# Patient Record
Sex: FEMALE | Race: OTHER | HISPANIC OR LATINO | URBAN - METROPOLITAN AREA
[De-identification: names, ages, dates, MRNs, and addresses within clinical notes are randomized per-mention and may not be internally consistent; named-entity substitution may affect disease eponyms.]

---

## 2018-01-17 ENCOUNTER — EMERGENCY (EMERGENCY)
Facility: HOSPITAL | Age: 58
LOS: 1 days | Discharge: HOME | End: 2018-01-17
Attending: EMERGENCY MEDICINE | Admitting: EMERGENCY MEDICINE
Payer: COMMERCIAL

## 2018-01-17 VITALS
HEART RATE: 87 BPM | SYSTOLIC BLOOD PRESSURE: 178 MMHG | TEMPERATURE: 98 F | OXYGEN SATURATION: 100 % | DIASTOLIC BLOOD PRESSURE: 98 MMHG

## 2018-01-17 VITALS — WEIGHT: 145.06 LBS | HEIGHT: 60 IN

## 2018-01-17 DIAGNOSIS — Z90.49 ACQUIRED ABSENCE OF OTHER SPECIFIED PARTS OF DIGESTIVE TRACT: Chronic | ICD-10-CM

## 2018-01-17 LAB
ALBUMIN SERPL ELPH-MCNC: 4.5 G/DL — SIGNIFICANT CHANGE UP (ref 3.3–5.2)
ALP SERPL-CCNC: 78 U/L — SIGNIFICANT CHANGE UP (ref 40–120)
ALT FLD-CCNC: 61 U/L — HIGH
ANION GAP SERPL CALC-SCNC: 16 MMOL/L — SIGNIFICANT CHANGE UP (ref 5–17)
APPEARANCE UR: CLEAR — SIGNIFICANT CHANGE UP
APTT BLD: 30.3 SEC — SIGNIFICANT CHANGE UP (ref 27.5–37.4)
AST SERPL-CCNC: 31 U/L — SIGNIFICANT CHANGE UP
BASOPHILS # BLD AUTO: 0 K/UL — SIGNIFICANT CHANGE UP (ref 0–0.2)
BASOPHILS NFR BLD AUTO: 0.3 % — SIGNIFICANT CHANGE UP (ref 0–2)
BILIRUB SERPL-MCNC: 0.5 MG/DL — SIGNIFICANT CHANGE UP (ref 0.4–2)
BILIRUB UR-MCNC: NEGATIVE — SIGNIFICANT CHANGE UP
BUN SERPL-MCNC: 16 MG/DL — SIGNIFICANT CHANGE UP (ref 8–20)
CALCIUM SERPL-MCNC: 10.7 MG/DL — HIGH (ref 8.6–10.2)
CHLORIDE SERPL-SCNC: 99 MMOL/L — SIGNIFICANT CHANGE UP (ref 98–107)
CO2 SERPL-SCNC: 25 MMOL/L — SIGNIFICANT CHANGE UP (ref 22–29)
COLOR SPEC: YELLOW — SIGNIFICANT CHANGE UP
CREAT SERPL-MCNC: 0.72 MG/DL — SIGNIFICANT CHANGE UP (ref 0.5–1.3)
DIFF PNL FLD: NEGATIVE — SIGNIFICANT CHANGE UP
EOSINOPHIL # BLD AUTO: 0.2 K/UL — SIGNIFICANT CHANGE UP (ref 0–0.5)
EOSINOPHIL NFR BLD AUTO: 1.5 % — SIGNIFICANT CHANGE UP (ref 0–6)
GLUCOSE SERPL-MCNC: 171 MG/DL — HIGH (ref 70–115)
GLUCOSE UR QL: NEGATIVE MG/DL — SIGNIFICANT CHANGE UP
HCT VFR BLD CALC: 44 % — SIGNIFICANT CHANGE UP (ref 37–47)
HGB BLD-MCNC: 15 G/DL — SIGNIFICANT CHANGE UP (ref 12–16)
INR BLD: 1.13 RATIO — SIGNIFICANT CHANGE UP (ref 0.88–1.16)
KETONES UR-MCNC: NEGATIVE — SIGNIFICANT CHANGE UP
LEUKOCYTE ESTERASE UR-ACNC: NEGATIVE — SIGNIFICANT CHANGE UP
LIDOCAIN IGE QN: 96 U/L — HIGH (ref 22–51)
LYMPHOCYTES # BLD AUTO: 4.4 K/UL — SIGNIFICANT CHANGE UP (ref 1–4.8)
LYMPHOCYTES # BLD AUTO: 41.2 % — SIGNIFICANT CHANGE UP (ref 20–55)
MAGNESIUM SERPL-MCNC: 2 MG/DL — SIGNIFICANT CHANGE UP (ref 1.6–2.6)
MCHC RBC-ENTMCNC: 29.8 PG — SIGNIFICANT CHANGE UP (ref 27–31)
MCHC RBC-ENTMCNC: 34.1 G/DL — SIGNIFICANT CHANGE UP (ref 32–36)
MCV RBC AUTO: 87.3 FL — SIGNIFICANT CHANGE UP (ref 81–99)
MONOCYTES # BLD AUTO: 0.5 K/UL — SIGNIFICANT CHANGE UP (ref 0–0.8)
MONOCYTES NFR BLD AUTO: 5 % — SIGNIFICANT CHANGE UP (ref 3–10)
NEUTROPHILS # BLD AUTO: 5.5 K/UL — SIGNIFICANT CHANGE UP (ref 1.8–8)
NEUTROPHILS NFR BLD AUTO: 51.7 % — SIGNIFICANT CHANGE UP (ref 37–73)
NITRITE UR-MCNC: NEGATIVE — SIGNIFICANT CHANGE UP
PH UR: 6.5 — SIGNIFICANT CHANGE UP (ref 5–8)
PHOSPHATE SERPL-MCNC: 4.1 MG/DL — SIGNIFICANT CHANGE UP (ref 2.4–4.7)
PLATELET # BLD AUTO: 332 K/UL — SIGNIFICANT CHANGE UP (ref 150–400)
POTASSIUM SERPL-MCNC: 4.2 MMOL/L — SIGNIFICANT CHANGE UP (ref 3.5–5.3)
POTASSIUM SERPL-SCNC: 4.2 MMOL/L — SIGNIFICANT CHANGE UP (ref 3.5–5.3)
PROT SERPL-MCNC: 8.7 G/DL — SIGNIFICANT CHANGE UP (ref 6.6–8.7)
PROT UR-MCNC: NEGATIVE MG/DL — SIGNIFICANT CHANGE UP
PROTHROM AB SERPL-ACNC: 12.5 SEC — SIGNIFICANT CHANGE UP (ref 9.8–12.7)
RBC # BLD: 5.04 M/UL — SIGNIFICANT CHANGE UP (ref 4.4–5.2)
RBC # FLD: 13.4 % — SIGNIFICANT CHANGE UP (ref 11–15.6)
SODIUM SERPL-SCNC: 140 MMOL/L — SIGNIFICANT CHANGE UP (ref 135–145)
SP GR SPEC: 1.01 — SIGNIFICANT CHANGE UP (ref 1.01–1.02)
UROBILINOGEN FLD QL: NEGATIVE MG/DL — SIGNIFICANT CHANGE UP
WBC # BLD: 10.7 K/UL — SIGNIFICANT CHANGE UP (ref 4.8–10.8)
WBC # FLD AUTO: 10.7 K/UL — SIGNIFICANT CHANGE UP (ref 4.8–10.8)

## 2018-01-17 PROCEDURE — 85730 THROMBOPLASTIN TIME PARTIAL: CPT

## 2018-01-17 PROCEDURE — 83690 ASSAY OF LIPASE: CPT

## 2018-01-17 PROCEDURE — T1013: CPT

## 2018-01-17 PROCEDURE — 81003 URINALYSIS AUTO W/O SCOPE: CPT

## 2018-01-17 PROCEDURE — 85027 COMPLETE CBC AUTOMATED: CPT

## 2018-01-17 PROCEDURE — 83735 ASSAY OF MAGNESIUM: CPT

## 2018-01-17 PROCEDURE — 99283 EMERGENCY DEPT VISIT LOW MDM: CPT | Mod: 25

## 2018-01-17 PROCEDURE — 36415 COLL VENOUS BLD VENIPUNCTURE: CPT

## 2018-01-17 PROCEDURE — 84100 ASSAY OF PHOSPHORUS: CPT

## 2018-01-17 PROCEDURE — 80053 COMPREHEN METABOLIC PANEL: CPT

## 2018-01-17 PROCEDURE — 93010 ELECTROCARDIOGRAM REPORT: CPT

## 2018-01-17 PROCEDURE — 87086 URINE CULTURE/COLONY COUNT: CPT

## 2018-01-17 PROCEDURE — 93005 ELECTROCARDIOGRAM TRACING: CPT

## 2018-01-17 PROCEDURE — 85610 PROTHROMBIN TIME: CPT

## 2018-01-17 PROCEDURE — 99284 EMERGENCY DEPT VISIT MOD MDM: CPT

## 2018-01-17 RX ORDER — LIDOCAINE 4 G/100G
10 CREAM TOPICAL ONCE
Qty: 0 | Refills: 0 | Status: COMPLETED | OUTPATIENT
Start: 2018-01-17 | End: 2018-01-17

## 2018-01-17 RX ORDER — ACETAMINOPHEN 500 MG
650 TABLET ORAL ONCE
Qty: 0 | Refills: 0 | Status: COMPLETED | OUTPATIENT
Start: 2018-01-17 | End: 2018-01-17

## 2018-01-17 RX ADMIN — Medication 30 MILLILITER(S): at 22:57

## 2018-01-17 RX ADMIN — Medication 650 MILLIGRAM(S): at 21:53

## 2018-01-17 RX ADMIN — LIDOCAINE 10 MILLILITER(S): 4 CREAM TOPICAL at 22:57

## 2018-01-17 NOTE — ED PROVIDER NOTE - MEDICAL DECISION MAKING DETAILS
feeling better tolerating po fluids retun to ed for itnractable abd pain fever or any overall worsneing

## 2018-01-17 NOTE — ED PROVIDER NOTE - OBJECTIVE STATEMENT
56 y/o f pt with a pmhx of appendectomy, DM, HTN  presents to the ED c/o abdominal pain, nausea and vomiting x1 month exacerbating this afternoon. Describes sensation as pressure and gassy. Has been taken Omeprazole for the issue with no relief. Today her pain was exacerbated due to meal consumption. Denies HA, dizziness, numbness, tingling, photophobia, diplopia, change in vision/hearing/gait/mental status/speech, focal weakness, neck pain, rash, fever, chills, stiffness, hx of DVT/PE, leg swelling, CP, SOB, palpitations, diaphoresis, D/C, change in urinary/bowel function, dysuria, hematuria, flank pain, malaise, or motor/sensory deficits. No further complaints at this time. NKDA.

## 2018-01-17 NOTE — ED ADULT NURSE NOTE - OBJECTIVE STATEMENT
Assumed pt care at 2050.  Pt a&ox3 with VSS and family at bedside.  Pt c/o abdominal pain from mid umbilical radiating to L side of abdomen for the past week with one episode of vomiting today.  Pt also reporting that this pain has been increasing over the past month intermittently.  Abdomen soft yet tender on palpation, no acute s/s of respiratory distress noted or reported, MD Andrea made aware of pts pain, awaiting further orders, will continue to monitor

## 2018-01-17 NOTE — ED ADULT TRIAGE NOTE - CHIEF COMPLAINT QUOTE
pt c/o abd pain for 1 month. pt states that the pain is worse and the pain is epigastric with nausea

## 2018-01-19 LAB
CULTURE RESULTS: SIGNIFICANT CHANGE UP
SPECIMEN SOURCE: SIGNIFICANT CHANGE UP

## 2018-11-20 PROBLEM — Z00.00 ENCOUNTER FOR PREVENTIVE HEALTH EXAMINATION: Status: ACTIVE | Noted: 2018-11-20

## 2018-12-20 ENCOUNTER — APPOINTMENT (OUTPATIENT)
Dept: ENDOCRINOLOGY | Facility: CLINIC | Age: 58
End: 2018-12-20

## 2021-09-14 ENCOUNTER — EMERGENCY (EMERGENCY)
Facility: HOSPITAL | Age: 61
LOS: 1 days | Discharge: HOME | End: 2021-09-14
Attending: EMERGENCY MEDICINE
Payer: SELF-PAY

## 2021-09-14 VITALS
WEIGHT: 151.9 LBS | SYSTOLIC BLOOD PRESSURE: 138 MMHG | OXYGEN SATURATION: 99 % | DIASTOLIC BLOOD PRESSURE: 83 MMHG | TEMPERATURE: 98 F | HEIGHT: 60 IN | RESPIRATION RATE: 19 BRPM | HEART RATE: 99 BPM

## 2021-09-14 DIAGNOSIS — Z90.49 ACQUIRED ABSENCE OF OTHER SPECIFIED PARTS OF DIGESTIVE TRACT: Chronic | ICD-10-CM

## 2021-09-14 PROCEDURE — 99284 EMERGENCY DEPT VISIT MOD MDM: CPT | Mod: 25

## 2021-09-14 PROCEDURE — 73610 X-RAY EXAM OF ANKLE: CPT

## 2021-09-14 PROCEDURE — 29515 APPLICATION SHORT LEG SPLINT: CPT | Mod: RT

## 2021-09-14 PROCEDURE — 73630 X-RAY EXAM OF FOOT: CPT

## 2021-09-14 PROCEDURE — T1013: CPT

## 2021-09-14 PROCEDURE — 73610 X-RAY EXAM OF ANKLE: CPT | Mod: 26,RT

## 2021-09-14 PROCEDURE — 73630 X-RAY EXAM OF FOOT: CPT | Mod: 26,RT

## 2021-09-14 NOTE — ED PROVIDER NOTE - CARE PLAN
Principal Discharge DX:	Sprain of other ligament of right ankle   1 Principal Discharge DX:	Fracture of distal end of right fibula

## 2021-09-14 NOTE — ED PROVIDER NOTE - CARE PROVIDER_API CALL
Hilton Nolan)  Orthopaedic Surgery  [address] [place] [address]  Phone: [phone]  Fax: [phone]  Follow Up Time:

## 2021-09-14 NOTE — ED PROVIDER NOTE - PATIENT PORTAL LINK FT
You can access the FollowMyHealth Patient Portal offered by [facility] by registering at the following website: [email or web address]. By joining [facility]’s FollowMyHealth portal, you will also be able to view your health information using other applications (apps) compatible with our system.

## 2021-09-14 NOTE — ED ADULT TRIAGE NOTE - CHIEF COMPLAINT QUOTE
Pt. c/o right ankle and foot pain s/p injury on sunday from fall while getting out of car.  Negative head trauma or LOC.  Pt states "I twisted it when I was getting out of the car and it cause me to fall."  Pt. unable to bear full weight to RLE

## 2021-09-14 NOTE — ED PROVIDER NOTE - CLINICAL SUMMARY MEDICAL DECISION MAKING FREE TEXT BOX
Diagnostic imaging results reviewed with patient who declined splint; analgesics, rest; PMD or ortho follow up recommended for reassessment. Patient is aware of signs/symptoms to return to the emergency department. Diagnostic imaging results reviewed with patient; splint; analgesics, rest; PMD or ortho follow up recommended for reassessment. Patient is aware of signs/symptoms to return to the emergency department.

## 2021-09-14 NOTE — ED PROVIDER NOTE - PROGRESS NOTE DETAILS
history and physical performed by intake physician - results reviewed and case discussed with attending  splinted - given crutches and ortho f/u

## 2021-09-22 ENCOUNTER — APPOINTMENT (OUTPATIENT)
Dept: ORTHOPEDIC SURGERY | Facility: CLINIC | Age: 61
End: 2021-09-22

## 2022-03-24 NOTE — ED ADULT TRIAGE NOTE - NS ED NURSE BANDS TYPE
Chief Complaint  Follow-up    Subjective          History of Present Illness  The patient is here to follow up on bilateral mastectomy with SLN bx.  They are doing well and have no complaints.  Pathology is shown below:    Clinical Information    Comment:    Malignant neoplasm of overlapping sites of right breast in female, estrogen receptor positive (HCC)      Final Diagnosis   1. Left breast, mastectomy:               - Sclerosing adenosis               - Intraductal papilloma               - Radial scar               - Florid usual ductal hyperplasia               - Fibroadenomatoid change               - Duct ectasia with calcification               - Fibrosis               - Calcified blood vessels     2. Right breast, mastectomy:               - Invasive carcinoma of no special type (ductal)               - High grade ductal carcinoma in situ (DCIS)               - See synoptic checklist     3. Right sentinel lymph node #1, excision:               - One lymph node negative for carcinoma (0/1)               - See synoptic checklist     4. Right breast, new margin, excision:               - Negative for carcinoma               - See synoptic checklist     5. Right axillary contents, dissection:               - Seventeen lymph nodes negative for carcinoma (0/17)               - See synoptic checklist      Electronically signed by Monty Joyner MD on 1/13/2022 at 1411   Synoptic Checklist     INVASIVE CARCINOMA OF THE BREAST: Resection  8th Edition - Protocol posted: 6/30/2021  INVASIVE CARCINOMA OF THE BREAST: COMPLETE EXCISION - 2, 3, 4, 5  SPECIMEN   Procedure  Total mastectomy    Specimen Laterality  Right    TUMOR   Histologic Type  Invasive carcinoma of no special type (ductal)    Histologic Grade (Ramon Histologic Score)     Glandular (Acinar) / Tubular Differentiation  Score 3    Nuclear Pleomorphism  Score 2    Mitotic Rate  Score 1    Overall Grade  Grade 2 (scores of 6 or 7)    Tumor Size   "Greatest dimension of largest invasive focus (Millimeters): 18 mm   Tumor Focality  Multiple foci of invasive carcinoma    Number of Foci  At least: 2    Ductal Carcinoma In Situ (DCIS)  Present    Size (Extent) of DCIS  Cannot be determined    Number of Blocks with DCIS  5    Number of Blocks Examined  13    Architectural Patterns  [place]      Cribriform    Nuclear Grade  Grade III (high)    Necrosis  Present, central (expansive \"comedo\" necrosis)    Tumor Extent     Treatment Effect in the Breast  No known presurgical therapy    MARGINS   Margin Status for Invasive Carcinoma  All margins negative for invasive carcinoma    Distance from Invasive Carcinoma to Closest Margin  5 mm   Closest Margin(s) to Invasive Carcinoma  Posterior    Margin Status for DCIS  All margins negative for DCIS    Distance from DCIS to Closest Margin  7 mm   Closest Margin(s) to DCIS  Posterior    REGIONAL LYMPH NODES   Regional Lymph Node Status  All regional lymph nodes negative for tumor    Total Number of Lymph Nodes Examined (sentinel and non-sentinel)  18    Number of [place] Nodes Examined  1    PATHOLOGIC STAGE CLASSIFICATION (pTNM, AJCC 8th Edition)   Reporting of pT, pN, and (when applicable) pM categories is based on information available to the pathologist at the time the report is issued. As per the AJCC (Chapter 1, 8th Ed.) it is the managing physician’s responsibility to establish the final pathologic stage based upon all pertinent information, including but potentially not limited to this pathology report.   TNM Descriptors  m (multiple foci of invasive carcinoma)    pT Category  pT1c    pN Category  pN0    Breast Biomarker Testing Performed on Previous Biopsy     Estrogen Receptor (ER) Status  Positive (greater than 10% of cells demonstrate nuclear positivity)    Percentage of Cells with Nuclear Positivity  100 %   Breast Biomarker Testing Performed on Previous Biopsy     Progesterone Receptor (PgR) Status  Positive  " "  Percentage of Cells with Nuclear Positivity  100 %   Breast Biomarker Testing Performed on Previous Biopsy     HER2 (by immunohistochemistry)  Equivocal (Score 2+)    Breast Biomarker Testing Performed on Previous Biopsy     HER2 (by in situ hybridization)  Negative (not amplified)    Breast Biomarker Testing Performed on Previous Biopsy     Ki-67 Percentage of Positive Nuclei  5 %   Testing Performed on Case Number  [id number]           Has hematoma from eliquis and was seen by Mavis HAGAN las week.  She has had this drained twice with the recurring seroma times two-point time I feel she would best be served by drain with continuous suction at this place we will see if radiology can place this.    Objective   Vital Signs:   Resp 15   Ht 165.1 cm (65\")   Wt 90.7 kg (200 lb)   BMI 33.28 kg/m²     Physical Exam  Vitals and nursing note reviewed.   Constitutional:       General: She is not in acute distress.     Appearance: Normal appearance. She is well-developed.   HENT:      Head: Normocephalic and atraumatic.   Eyes:      Extraocular Movements: Extraocular movements intact.      Pupils: Pupils are equal, round, and reactive to light.   Cardiovascular:      Pulses: Normal pulses.   Pulmonary:      Effort: Pulmonary effort is normal. No retractions.      Breath sounds: Normal air entry. No wheezing.   Abdominal:      General: There is no distension.      Palpations: Abdomen is soft.      Tenderness: There is no abdominal tenderness.      Hernia: No hernia is present.   Musculoskeletal:         General: No swelling or deformity.      Cervical back: Neck supple.   Skin:     General: Skin is warm and dry.      Findings: No erythema.      Comments: Surgical Incision Healing Well   Neurological:      General: No focal deficit present.      Mental Status: She is alert and oriented to person, place, and time.      Motor: Motor function is intact.   Psychiatric:         Mood and Affect: Mood normal.         Thought " Content: Thought content normal.                 Assessment and Plan    Diagnoses and all orders for this visit:    1. Malignant neoplasm of overlapping sites of right breast in female, estrogen receptor positive (HCC) (Primary)    2. Seroma of breast  -     US Guided Abscess Drain Breast Right; Future    Keep appointment with oncology  Follow-up with me 2 weeks  Have radiology place drain    Follow Up   Return in about 2 weeks (around 4/7/2022).  Patient was given instructions and counseling regarding her condition or for health maintenance advice. Please see specific information pulled into the AVS if appropriate.      Name band;

## 2024-05-10 ENCOUNTER — OFFICE (OUTPATIENT)
Dept: URBAN - METROPOLITAN AREA CLINIC 94 | Facility: CLINIC | Age: 64
Setting detail: OPHTHALMOLOGY
End: 2024-05-10
Payer: COMMERCIAL

## 2024-05-10 DIAGNOSIS — H35.3131: ICD-10-CM

## 2024-05-10 DIAGNOSIS — H16.221: ICD-10-CM

## 2024-05-10 DIAGNOSIS — H16.222: ICD-10-CM

## 2024-05-10 DIAGNOSIS — H35.033: ICD-10-CM

## 2024-05-10 PROCEDURE — 99214 OFFICE O/P EST MOD 30 MIN: CPT | Performed by: OPHTHALMOLOGY

## 2024-05-10 PROCEDURE — 92250 FUNDUS PHOTOGRAPHY W/I&R: CPT | Performed by: OPHTHALMOLOGY

## 2024-05-10 PROCEDURE — 83861 MICROFLUID ANALY TEARS: CPT | Mod: RT | Performed by: OPHTHALMOLOGY

## 2024-05-10 PROCEDURE — 83861 MICROFLUID ANALY TEARS: CPT | Mod: LT | Performed by: OPHTHALMOLOGY

## 2024-05-10 ASSESSMENT — CONFRONTATIONAL VISUAL FIELD TEST (CVF)
OS_FINDINGS: FULL
OD_FINDINGS: FULL

## 2024-05-13 ENCOUNTER — ASC (OUTPATIENT)
Dept: URBAN - METROPOLITAN AREA SURGERY 8 | Facility: SURGERY | Age: 64
Setting detail: OPHTHALMOLOGY
End: 2024-05-13
Payer: COMMERCIAL

## 2024-05-13 ENCOUNTER — OFFICE (OUTPATIENT)
Dept: URBAN - METROPOLITAN AREA CLINIC 94 | Facility: CLINIC | Age: 64
Setting detail: OPHTHALMOLOGY
End: 2024-05-13
Payer: COMMERCIAL

## 2024-05-13 DIAGNOSIS — H35.033: ICD-10-CM

## 2024-05-13 DIAGNOSIS — E11.3312: ICD-10-CM

## 2024-05-13 DIAGNOSIS — H43.23: ICD-10-CM

## 2024-05-13 DIAGNOSIS — E11.3313: ICD-10-CM

## 2024-05-13 DIAGNOSIS — H35.3131: ICD-10-CM

## 2024-05-13 PROBLEM — H16.222 DRY EYE SYNDROME K SICCA; RIGHT EYE, LEFT EYE, BOTH EYES: Status: ACTIVE | Noted: 2024-05-10

## 2024-05-13 PROBLEM — H26.493 POSTERIOR CAPSULAR OPACIFICATION; BOTH EYES: Status: ACTIVE | Noted: 2024-05-10

## 2024-05-13 PROBLEM — H16.221 DRY EYE SYNDROME K SICCA; RIGHT EYE, LEFT EYE, BOTH EYES: Status: ACTIVE | Noted: 2024-05-10

## 2024-05-13 PROBLEM — H16.223 DRY EYE SYNDROME K SICCA; RIGHT EYE, LEFT EYE, BOTH EYES: Status: ACTIVE | Noted: 2024-05-10

## 2024-05-13 PROCEDURE — 92235 FLUORESCEIN ANGRPH MLTIFRAME: CPT | Performed by: SPECIALIST

## 2024-05-13 PROCEDURE — 92014 COMPRE OPH EXAM EST PT 1/>: CPT | Mod: 57 | Performed by: SPECIALIST

## 2024-05-13 PROCEDURE — 67210 TREATMENT OF RETINAL LESION: CPT | Mod: LT | Performed by: SPECIALIST

## 2024-05-13 PROCEDURE — 92134 CPTRZ OPH DX IMG PST SGM RTA: CPT | Performed by: SPECIALIST

## 2024-05-13 ASSESSMENT — CONFRONTATIONAL VISUAL FIELD TEST (CVF)
OS_FINDINGS: FULL
OD_FINDINGS: FULL

## 2024-06-06 ENCOUNTER — ASC (OUTPATIENT)
Dept: URBAN - METROPOLITAN AREA SURGERY 8 | Facility: SURGERY | Age: 64
Setting detail: OPHTHALMOLOGY
End: 2024-06-06
Payer: COMMERCIAL

## 2024-06-06 DIAGNOSIS — H26.491: ICD-10-CM

## 2024-06-06 PROCEDURE — 66821 AFTER CATARACT LASER SURGERY: CPT | Mod: 79,RT | Performed by: OPHTHALMOLOGY

## 2024-06-07 ENCOUNTER — RX ONLY (RX ONLY)
Age: 64
End: 2024-06-07

## 2024-06-07 ENCOUNTER — OFFICE (OUTPATIENT)
Dept: URBAN - METROPOLITAN AREA CLINIC 94 | Facility: CLINIC | Age: 64
Setting detail: OPHTHALMOLOGY
End: 2024-06-07
Payer: COMMERCIAL

## 2024-06-07 ENCOUNTER — ASC (OUTPATIENT)
Dept: URBAN - METROPOLITAN AREA SURGERY 8 | Facility: SURGERY | Age: 64
Setting detail: OPHTHALMOLOGY
End: 2024-06-07
Payer: COMMERCIAL

## 2024-06-07 DIAGNOSIS — E11.3311: ICD-10-CM

## 2024-06-07 DIAGNOSIS — E11.3313: ICD-10-CM

## 2024-06-07 DIAGNOSIS — H26.492: ICD-10-CM

## 2024-06-07 DIAGNOSIS — E11.3312: ICD-10-CM

## 2024-06-07 PROCEDURE — 92134 CPTRZ OPH DX IMG PST SGM RTA: CPT | Performed by: OPHTHALMOLOGY

## 2024-06-07 PROCEDURE — 99024 POSTOP FOLLOW-UP VISIT: CPT | Mod: 24,57 | Performed by: OPHTHALMOLOGY

## 2024-06-07 PROCEDURE — 66821 AFTER CATARACT LASER SURGERY: CPT | Mod: 79,LT | Performed by: OPHTHALMOLOGY

## 2024-06-17 ENCOUNTER — OFFICE (OUTPATIENT)
Dept: URBAN - METROPOLITAN AREA CLINIC 94 | Facility: CLINIC | Age: 64
Setting detail: OPHTHALMOLOGY
End: 2024-06-17
Payer: COMMERCIAL

## 2024-06-17 DIAGNOSIS — E11.3311: ICD-10-CM

## 2024-06-17 DIAGNOSIS — H35.3131: ICD-10-CM

## 2024-06-17 DIAGNOSIS — H35.033: ICD-10-CM

## 2024-06-17 PROCEDURE — 92134 CPTRZ OPH DX IMG PST SGM RTA: CPT | Performed by: SPECIALIST

## 2024-06-17 PROCEDURE — 67028 INJECTION EYE DRUG: CPT | Mod: 79,RT | Performed by: SPECIALIST

## 2024-06-17 ASSESSMENT — CONFRONTATIONAL VISUAL FIELD TEST (CVF)
OS_FINDINGS: FULL
OD_FINDINGS: FULL

## 2024-06-28 ENCOUNTER — OFFICE (OUTPATIENT)
Dept: URBAN - METROPOLITAN AREA CLINIC 94 | Facility: CLINIC | Age: 64
Setting detail: OPHTHALMOLOGY
End: 2024-06-28
Payer: COMMERCIAL

## 2024-06-28 DIAGNOSIS — H43.311: ICD-10-CM

## 2024-06-28 PROBLEM — H26.491 POSTERIOR CAPSULAR OPACIFICATION; RIGHT EYE, LEFT EYE: Status: RESOLVED | Noted: 2024-06-06 | Resolved: 2024-06-28

## 2024-06-28 PROBLEM — H26.492 POSTERIOR CAPSULAR OPACIFICATION; RIGHT EYE, LEFT EYE: Status: RESOLVED | Noted: 2024-06-06 | Resolved: 2024-06-28

## 2024-06-28 PROCEDURE — 99212 OFFICE O/P EST SF 10 MIN: CPT | Mod: 24 | Performed by: OPHTHALMOLOGY

## 2024-06-28 ASSESSMENT — CONFRONTATIONAL VISUAL FIELD TEST (CVF)
OD_FINDINGS: FULL
OS_FINDINGS: FULL

## 2024-07-08 ENCOUNTER — ASC (OUTPATIENT)
Dept: URBAN - METROPOLITAN AREA SURGERY 8 | Facility: SURGERY | Age: 64
Setting detail: OPHTHALMOLOGY
End: 2024-07-08
Payer: COMMERCIAL

## 2024-07-08 DIAGNOSIS — H35.033: ICD-10-CM

## 2024-07-08 DIAGNOSIS — H35.3131: ICD-10-CM

## 2024-07-08 DIAGNOSIS — E11.3311: ICD-10-CM

## 2024-07-08 DIAGNOSIS — E11.3312: ICD-10-CM

## 2024-07-08 PROCEDURE — 67210 TREATMENT OF RETINAL LESION: CPT | Mod: 79,RT | Performed by: SPECIALIST

## 2024-07-08 PROCEDURE — 99024 POSTOP FOLLOW-UP VISIT: CPT | Performed by: SPECIALIST

## 2024-07-08 ASSESSMENT — CONFRONTATIONAL VISUAL FIELD TEST (CVF)
OD_FINDINGS: FULL
OS_FINDINGS: FULL

## 2024-07-10 ENCOUNTER — ASC (OUTPATIENT)
Dept: URBAN - METROPOLITAN AREA SURGERY 8 | Facility: SURGERY | Age: 64
Setting detail: OPHTHALMOLOGY
End: 2024-07-10
Payer: COMMERCIAL

## 2024-07-10 DIAGNOSIS — H43.311: ICD-10-CM

## 2024-07-10 PROCEDURE — 67031 LASER SURGERY EYE STRANDS: CPT | Mod: 79,RT | Performed by: OPHTHALMOLOGY

## 2024-07-22 ENCOUNTER — OFFICE (OUTPATIENT)
Dept: URBAN - METROPOLITAN AREA CLINIC 116 | Facility: CLINIC | Age: 64
Setting detail: OPHTHALMOLOGY
End: 2024-07-22
Payer: COMMERCIAL

## 2024-07-22 DIAGNOSIS — H43.311: ICD-10-CM

## 2024-07-22 PROCEDURE — 99024 POSTOP FOLLOW-UP VISIT: CPT | Performed by: OPTOMETRIST

## 2024-07-22 PROCEDURE — 92250 FUNDUS PHOTOGRAPHY W/I&R: CPT | Performed by: OPTOMETRIST

## 2024-07-22 ASSESSMENT — CONFRONTATIONAL VISUAL FIELD TEST (CVF)
OS_FINDINGS: FULL
OD_FINDINGS: FULL

## 2024-09-09 ENCOUNTER — OFFICE (OUTPATIENT)
Dept: URBAN - METROPOLITAN AREA CLINIC 94 | Facility: CLINIC | Age: 64
Setting detail: OPHTHALMOLOGY
End: 2024-09-09
Payer: COMMERCIAL

## 2024-09-09 ENCOUNTER — ASC (OUTPATIENT)
Dept: URBAN - METROPOLITAN AREA SURGERY 8 | Facility: SURGERY | Age: 64
Setting detail: OPHTHALMOLOGY
End: 2024-09-09
Payer: COMMERCIAL

## 2024-09-09 DIAGNOSIS — H35.3111: ICD-10-CM

## 2024-09-09 DIAGNOSIS — E11.3311: ICD-10-CM

## 2024-09-09 DIAGNOSIS — E11.3312: ICD-10-CM

## 2024-09-09 DIAGNOSIS — H35.3121: ICD-10-CM

## 2024-09-09 DIAGNOSIS — H35.033: ICD-10-CM

## 2024-09-09 DIAGNOSIS — H43.23: ICD-10-CM

## 2024-09-09 DIAGNOSIS — H35.3131: ICD-10-CM

## 2024-09-09 PROCEDURE — 92235 FLUORESCEIN ANGRPH MLTIFRAME: CPT | Performed by: SPECIALIST

## 2024-09-09 PROCEDURE — 67210 TREATMENT OF RETINAL LESION: CPT | Mod: 79,LT | Performed by: SPECIALIST

## 2024-09-09 PROCEDURE — 92134 CPTRZ OPH DX IMG PST SGM RTA: CPT | Performed by: SPECIALIST

## 2024-09-09 PROCEDURE — 99024 POSTOP FOLLOW-UP VISIT: CPT | Performed by: SPECIALIST

## 2024-09-09 ASSESSMENT — CONFRONTATIONAL VISUAL FIELD TEST (CVF)
OS_FINDINGS: FULL
OD_FINDINGS: FULL

## 2024-10-07 ENCOUNTER — ASC (OUTPATIENT)
Dept: URBAN - METROPOLITAN AREA SURGERY 8 | Facility: SURGERY | Age: 64
Setting detail: OPHTHALMOLOGY
End: 2024-10-07
Payer: COMMERCIAL

## 2024-10-07 DIAGNOSIS — H35.3131: ICD-10-CM

## 2024-10-07 DIAGNOSIS — H35.3111: ICD-10-CM

## 2024-10-07 DIAGNOSIS — H35.3121: ICD-10-CM

## 2024-10-07 DIAGNOSIS — E11.3312: ICD-10-CM

## 2024-10-07 DIAGNOSIS — H43.23: ICD-10-CM

## 2024-10-07 DIAGNOSIS — E11.3311: ICD-10-CM

## 2024-10-07 DIAGNOSIS — H35.033: ICD-10-CM

## 2024-10-07 PROCEDURE — 67210 TREATMENT OF RETINAL LESION: CPT | Mod: 79,RT | Performed by: SPECIALIST

## 2024-10-07 PROCEDURE — 99024 POSTOP FOLLOW-UP VISIT: CPT | Performed by: SPECIALIST

## 2024-10-07 ASSESSMENT — VISUAL ACUITY
OD_BCVA: 20/40
OS_BCVA: 20/30+1

## 2024-10-07 ASSESSMENT — KERATOMETRY
OS_K2POWER_DIOPTERS: 46.50
OS_K1POWER_DIOPTERS: 45.50
OD_K2POWER_DIOPTERS: 47.00
OD_AXISANGLE_DEGREES: 054
OS_AXISANGLE_DEGREES: 112
METHOD_AUTO_MANUAL: AUTO
OD_K1POWER_DIOPTERS: 45.25

## 2024-10-07 ASSESSMENT — CONFRONTATIONAL VISUAL FIELD TEST (CVF)
OD_FINDINGS: FULL
OS_FINDINGS: FULL

## 2024-10-07 ASSESSMENT — REFRACTION_AUTOREFRACTION
OS_AXIS: 051
OD_AXIS: 112
OS_SPHERE: +0.75
OS_CYLINDER: -0.75
OD_SPHERE: +0.50
OD_CYLINDER: -0.50

## 2024-10-07 ASSESSMENT — SUPERFICIAL PUNCTATE KERATITIS (SPK)
OD_SPK: T
OS_SPK: T

## 2024-10-07 ASSESSMENT — TONOMETRY: OS_IOP_MMHG: 17

## 2024-11-18 ENCOUNTER — OFFICE (OUTPATIENT)
Dept: URBAN - METROPOLITAN AREA CLINIC 94 | Facility: CLINIC | Age: 64
Setting detail: OPHTHALMOLOGY
End: 2024-11-18
Payer: COMMERCIAL

## 2024-11-18 DIAGNOSIS — H35.033: ICD-10-CM

## 2024-11-18 DIAGNOSIS — E11.3311: ICD-10-CM

## 2024-11-18 DIAGNOSIS — H35.3131: ICD-10-CM

## 2024-11-18 DIAGNOSIS — E11.3312: ICD-10-CM

## 2024-11-18 PROCEDURE — 92134 CPTRZ OPH DX IMG PST SGM RTA: CPT | Performed by: SPECIALIST

## 2024-11-18 PROCEDURE — 92235 FLUORESCEIN ANGRPH MLTIFRAME: CPT | Performed by: SPECIALIST

## 2024-11-18 PROCEDURE — 67028 INJECTION EYE DRUG: CPT | Mod: 58,RT | Performed by: SPECIALIST

## 2024-11-18 ASSESSMENT — REFRACTION_AUTOREFRACTION
OS_SPHERE: +0.75
OS_CYLINDER: -0.75
OD_SPHERE: +0.50
OS_AXIS: 051
OD_AXIS: 112
OD_CYLINDER: -0.50

## 2024-11-18 ASSESSMENT — KERATOMETRY
OD_AXISANGLE_DEGREES: 054
OS_K1POWER_DIOPTERS: 45.50
OS_K2POWER_DIOPTERS: 46.50
OD_K1POWER_DIOPTERS: 45.25
OD_K2POWER_DIOPTERS: 47.00
METHOD_AUTO_MANUAL: AUTO
OS_AXISANGLE_DEGREES: 112

## 2024-11-18 ASSESSMENT — TONOMETRY: OS_IOP_MMHG: 16

## 2024-11-18 ASSESSMENT — CONFRONTATIONAL VISUAL FIELD TEST (CVF)
OS_FINDINGS: FULL
OD_FINDINGS: FULL

## 2024-11-18 ASSESSMENT — VISUAL ACUITY
OD_BCVA: 20/40
OS_BCVA: 20/30+

## 2024-11-18 ASSESSMENT — SUPERFICIAL PUNCTATE KERATITIS (SPK)
OD_SPK: T
OS_SPK: T

## 2024-11-25 ENCOUNTER — OFFICE (OUTPATIENT)
Dept: URBAN - METROPOLITAN AREA CLINIC 94 | Facility: CLINIC | Age: 64
Setting detail: OPHTHALMOLOGY
End: 2024-11-25
Payer: COMMERCIAL

## 2024-11-25 DIAGNOSIS — E11.3312: ICD-10-CM

## 2024-11-25 DIAGNOSIS — H43.23: ICD-10-CM

## 2024-11-25 DIAGNOSIS — H35.033: ICD-10-CM

## 2024-11-25 DIAGNOSIS — H35.3131: ICD-10-CM

## 2024-11-25 DIAGNOSIS — E11.3311: ICD-10-CM

## 2024-11-25 PROCEDURE — 92134 CPTRZ OPH DX IMG PST SGM RTA: CPT | Performed by: SPECIALIST

## 2024-11-25 PROCEDURE — 99024 POSTOP FOLLOW-UP VISIT: CPT | Performed by: SPECIALIST

## 2024-11-25 ASSESSMENT — CONFRONTATIONAL VISUAL FIELD TEST (CVF)
OD_FINDINGS: FULL
OS_FINDINGS: FULL

## 2024-11-25 ASSESSMENT — KERATOMETRY
OS_K2POWER_DIOPTERS: 46.50
OD_K1POWER_DIOPTERS: 45.25
OS_AXISANGLE_DEGREES: 112
OD_K2POWER_DIOPTERS: 47.00
METHOD_AUTO_MANUAL: AUTO
OD_AXISANGLE_DEGREES: 054
OS_K1POWER_DIOPTERS: 45.50

## 2024-11-25 ASSESSMENT — REFRACTION_AUTOREFRACTION
OS_CYLINDER: -0.75
OD_SPHERE: +0.50
OD_AXIS: 112
OS_SPHERE: +0.75
OD_CYLINDER: -0.50
OS_AXIS: 051

## 2024-11-25 ASSESSMENT — VISUAL ACUITY
OD_BCVA: 20/30+
OS_BCVA: 20/40

## 2024-11-25 ASSESSMENT — SUPERFICIAL PUNCTATE KERATITIS (SPK)
OD_SPK: T
OS_SPK: T

## 2024-11-25 ASSESSMENT — TONOMETRY: OS_IOP_MMHG: 17

## 2025-01-06 ENCOUNTER — ASC (OUTPATIENT)
Dept: URBAN - METROPOLITAN AREA SURGERY 8 | Facility: SURGERY | Age: 65
Setting detail: OPHTHALMOLOGY
End: 2025-01-06
Payer: COMMERCIAL

## 2025-01-06 ENCOUNTER — OFFICE (OUTPATIENT)
Dept: URBAN - METROPOLITAN AREA CLINIC 94 | Facility: CLINIC | Age: 65
Setting detail: OPHTHALMOLOGY
End: 2025-01-06
Payer: COMMERCIAL

## 2025-01-06 DIAGNOSIS — E11.3312: ICD-10-CM

## 2025-01-06 DIAGNOSIS — H35.033: ICD-10-CM

## 2025-01-06 DIAGNOSIS — H35.3131: ICD-10-CM

## 2025-01-06 PROCEDURE — 67210 TREATMENT OF RETINAL LESION: CPT | Mod: LT | Performed by: SPECIALIST

## 2025-01-06 PROCEDURE — 92134 CPTRZ OPH DX IMG PST SGM RTA: CPT | Performed by: SPECIALIST

## 2025-01-06 PROCEDURE — 92012 INTRM OPH EXAM EST PATIENT: CPT | Mod: 57 | Performed by: SPECIALIST

## 2025-01-06 ASSESSMENT — TONOMETRY: OS_IOP_MMHG: 16

## 2025-01-06 ASSESSMENT — CONFRONTATIONAL VISUAL FIELD TEST (CVF)
OS_FINDINGS: FULL
OD_FINDINGS: FULL

## 2025-01-06 ASSESSMENT — VISUAL ACUITY
OS_BCVA: 20/25-1
OD_BCVA: 20/25

## 2025-01-06 ASSESSMENT — KERATOMETRY
METHOD_AUTO_MANUAL: AUTO
OD_AXISANGLE_DEGREES: 054
OD_K1POWER_DIOPTERS: 45.25
OD_K2POWER_DIOPTERS: 47.00
OS_AXISANGLE_DEGREES: 112
OS_K2POWER_DIOPTERS: 46.50
OS_K1POWER_DIOPTERS: 45.50

## 2025-01-06 ASSESSMENT — REFRACTION_AUTOREFRACTION
OD_CYLINDER: -0.50
OD_AXIS: 112
OS_AXIS: 051
OS_SPHERE: +0.75
OD_SPHERE: +0.50
OS_CYLINDER: -0.75

## 2025-01-06 ASSESSMENT — SUPERFICIAL PUNCTATE KERATITIS (SPK)
OD_SPK: T
OS_SPK: T

## 2025-02-03 ENCOUNTER — OFFICE (OUTPATIENT)
Dept: URBAN - METROPOLITAN AREA CLINIC 94 | Facility: CLINIC | Age: 65
Setting detail: OPHTHALMOLOGY
End: 2025-02-03
Payer: COMMERCIAL

## 2025-02-03 DIAGNOSIS — E11.3311: ICD-10-CM

## 2025-02-03 DIAGNOSIS — H35.033: ICD-10-CM

## 2025-02-03 PROCEDURE — 67028 INJECTION EYE DRUG: CPT | Mod: 79,RT | Performed by: SPECIALIST

## 2025-02-03 PROCEDURE — 92134 CPTRZ OPH DX IMG PST SGM RTA: CPT | Performed by: SPECIALIST

## 2025-02-03 ASSESSMENT — KERATOMETRY
OS_K1POWER_DIOPTERS: 45.50
OD_AXISANGLE_DEGREES: 054
OS_K2POWER_DIOPTERS: 46.50
METHOD_AUTO_MANUAL: AUTO
OS_AXISANGLE_DEGREES: 112
OD_K2POWER_DIOPTERS: 47.00
OD_K1POWER_DIOPTERS: 45.25

## 2025-02-03 ASSESSMENT — CONFRONTATIONAL VISUAL FIELD TEST (CVF)
OD_FINDINGS: FULL
OS_FINDINGS: FULL

## 2025-02-03 ASSESSMENT — VISUAL ACUITY
OD_BCVA: 20/30+1
OS_BCVA: 20/30+1

## 2025-02-03 ASSESSMENT — REFRACTION_AUTOREFRACTION
OD_AXIS: 112
OD_SPHERE: +0.50
OD_CYLINDER: -0.50
OS_SPHERE: +0.75
OS_AXIS: 051
OS_CYLINDER: -0.75

## 2025-02-03 ASSESSMENT — TONOMETRY
OD_IOP_MMHG: 20
OS_IOP_MMHG: 20

## 2025-02-03 ASSESSMENT — SUPERFICIAL PUNCTATE KERATITIS (SPK)
OS_SPK: T
OD_SPK: T

## 2025-03-10 ENCOUNTER — ASC (OUTPATIENT)
Dept: URBAN - METROPOLITAN AREA SURGERY 8 | Facility: SURGERY | Age: 65
Setting detail: OPHTHALMOLOGY
End: 2025-03-10
Payer: COMMERCIAL

## 2025-03-10 ENCOUNTER — OFFICE (OUTPATIENT)
Dept: URBAN - METROPOLITAN AREA CLINIC 94 | Facility: CLINIC | Age: 65
Setting detail: OPHTHALMOLOGY
End: 2025-03-10
Payer: COMMERCIAL

## 2025-03-10 DIAGNOSIS — E11.3311: ICD-10-CM

## 2025-03-10 DIAGNOSIS — H35.3131: ICD-10-CM

## 2025-03-10 DIAGNOSIS — H35.3121: ICD-10-CM

## 2025-03-10 DIAGNOSIS — E11.3313: ICD-10-CM

## 2025-03-10 DIAGNOSIS — H43.23: ICD-10-CM

## 2025-03-10 DIAGNOSIS — H35.3111: ICD-10-CM

## 2025-03-10 DIAGNOSIS — H35.033: ICD-10-CM

## 2025-03-10 DIAGNOSIS — E11.3312: ICD-10-CM

## 2025-03-10 PROCEDURE — 67210 TREATMENT OF RETINAL LESION: CPT | Mod: 79,LT | Performed by: SPECIALIST

## 2025-03-10 PROCEDURE — 92134 CPTRZ OPH DX IMG PST SGM RTA: CPT | Performed by: SPECIALIST

## 2025-03-10 PROCEDURE — 67210 TREATMENT OF RETINAL LESION: CPT | Mod: 79,RT | Performed by: SPECIALIST

## 2025-03-10 PROCEDURE — 99024 POSTOP FOLLOW-UP VISIT: CPT | Performed by: SPECIALIST

## 2025-03-10 ASSESSMENT — SUPERFICIAL PUNCTATE KERATITIS (SPK)
OD_SPK: T
OS_SPK: T

## 2025-03-10 ASSESSMENT — VISUAL ACUITY
OD_BCVA: 20/40+
OS_BCVA: 20/30

## 2025-03-10 ASSESSMENT — KERATOMETRY
OS_AXISANGLE_DEGREES: 112
METHOD_AUTO_MANUAL: AUTO
OS_K1POWER_DIOPTERS: 45.50
OD_K2POWER_DIOPTERS: 47.00
OS_K2POWER_DIOPTERS: 46.50
OD_AXISANGLE_DEGREES: 054
OD_K1POWER_DIOPTERS: 45.25

## 2025-03-10 ASSESSMENT — REFRACTION_AUTOREFRACTION
OS_AXIS: 051
OS_CYLINDER: -0.75
OD_AXIS: 112
OD_CYLINDER: -0.50
OS_SPHERE: +0.75
OD_SPHERE: +0.50

## 2025-03-10 ASSESSMENT — CONFRONTATIONAL VISUAL FIELD TEST (CVF)
OD_FINDINGS: FULL
OS_FINDINGS: FULL

## 2025-03-10 ASSESSMENT — TONOMETRY
OD_IOP_MMHG: 20
OS_IOP_MMHG: 18

## 2025-03-17 ENCOUNTER — OFFICE (OUTPATIENT)
Dept: URBAN - METROPOLITAN AREA CLINIC 116 | Facility: CLINIC | Age: 65
Setting detail: OPHTHALMOLOGY
End: 2025-03-17
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
## 2025-03-17 PROCEDURE — NO SHOW FE NO SHOW FEE: Performed by: OPTOMETRIST

## 2025-05-05 ENCOUNTER — OFFICE (OUTPATIENT)
Dept: URBAN - METROPOLITAN AREA CLINIC 94 | Facility: CLINIC | Age: 65
Setting detail: OPHTHALMOLOGY
End: 2025-05-05
Payer: COMMERCIAL

## 2025-05-05 ENCOUNTER — ASC (OUTPATIENT)
Dept: URBAN - METROPOLITAN AREA SURGERY 8 | Facility: SURGERY | Age: 65
Setting detail: OPHTHALMOLOGY
End: 2025-05-05
Payer: COMMERCIAL

## 2025-05-05 DIAGNOSIS — E11.3312: ICD-10-CM

## 2025-05-05 DIAGNOSIS — E11.3313: ICD-10-CM

## 2025-05-05 DIAGNOSIS — H35.3131: ICD-10-CM

## 2025-05-05 PROCEDURE — 92235 FLUORESCEIN ANGRPH MLTIFRAME: CPT | Performed by: SPECIALIST

## 2025-05-05 PROCEDURE — 92134 CPTRZ OPH DX IMG PST SGM RTA: CPT | Performed by: SPECIALIST

## 2025-05-05 PROCEDURE — 67210 TREATMENT OF RETINAL LESION: CPT | Mod: LT | Performed by: SPECIALIST

## 2025-05-05 ASSESSMENT — KERATOMETRY
OD_AXISANGLE_DEGREES: 055
OS_K1POWER_DIOPTERS: 45.50
OD_K1POWER_DIOPTERS: 45.25
METHOD_AUTO_MANUAL: AUTO
OS_K2POWER_DIOPTERS: 46.50
OS_AXISANGLE_DEGREES: 115
OD_K2POWER_DIOPTERS: 47.00

## 2025-05-05 ASSESSMENT — SUPERFICIAL PUNCTATE KERATITIS (SPK)
OD_SPK: T
OS_SPK: T

## 2025-05-05 ASSESSMENT — TONOMETRY
OS_IOP_MMHG: 18
OD_IOP_MMHG: 20

## 2025-05-05 ASSESSMENT — REFRACTION_AUTOREFRACTION
OD_CYLINDER: -0.50
OS_SPHERE: +0.75
OD_SPHERE: +0.50
OD_AXIS: 115
OS_AXIS: 050
OS_CYLINDER: -0.75

## 2025-05-05 ASSESSMENT — CONFRONTATIONAL VISUAL FIELD TEST (CVF)
OS_FINDINGS: FULL
OD_FINDINGS: FULL

## 2025-05-05 ASSESSMENT — VISUAL ACUITY
OD_BCVA: 20/40
OS_BCVA: 20/30

## 2025-06-09 ENCOUNTER — OFFICE (OUTPATIENT)
Dept: URBAN - METROPOLITAN AREA CLINIC 94 | Facility: CLINIC | Age: 65
Setting detail: OPHTHALMOLOGY
End: 2025-06-09
Payer: COMMERCIAL

## 2025-06-09 DIAGNOSIS — E11.3311: ICD-10-CM

## 2025-06-09 DIAGNOSIS — H35.033: ICD-10-CM

## 2025-06-09 PROCEDURE — 92134 CPTRZ OPH DX IMG PST SGM RTA: CPT | Performed by: OPHTHALMOLOGY

## 2025-06-09 PROCEDURE — 67028 INJECTION EYE DRUG: CPT | Mod: 79,RT | Performed by: OPHTHALMOLOGY

## 2025-06-09 ASSESSMENT — TONOMETRY
OD_IOP_MMHG: 20
OS_IOP_MMHG: 18

## 2025-06-09 ASSESSMENT — REFRACTION_AUTOREFRACTION
OS_SPHERE: +0.75
OS_CYLINDER: -0.75
OS_AXIS: 050
OD_SPHERE: +0.50
OD_CYLINDER: -0.50
OD_AXIS: 115

## 2025-06-09 ASSESSMENT — KERATOMETRY
OD_K2POWER_DIOPTERS: 47.00
OS_K1POWER_DIOPTERS: 45.50
OS_AXISANGLE_DEGREES: 115
OD_K1POWER_DIOPTERS: 45.25
OD_AXISANGLE_DEGREES: 055
METHOD_AUTO_MANUAL: AUTO
OS_K2POWER_DIOPTERS: 46.50

## 2025-06-09 ASSESSMENT — VISUAL ACUITY
OS_BCVA: 20/30
OD_BCVA: 20/30

## 2025-06-09 ASSESSMENT — CONFRONTATIONAL VISUAL FIELD TEST (CVF)
OD_FINDINGS: FULL
OS_FINDINGS: FULL

## 2025-06-09 ASSESSMENT — SUPERFICIAL PUNCTATE KERATITIS (SPK)
OS_SPK: T
OD_SPK: T

## 2025-06-23 ENCOUNTER — ASC (OUTPATIENT)
Dept: URBAN - METROPOLITAN AREA SURGERY 8 | Facility: SURGERY | Age: 65
Setting detail: OPHTHALMOLOGY
End: 2025-06-23
Payer: COMMERCIAL

## 2025-06-23 ENCOUNTER — OFFICE (OUTPATIENT)
Dept: URBAN - METROPOLITAN AREA CLINIC 94 | Facility: CLINIC | Age: 65
Setting detail: OPHTHALMOLOGY
End: 2025-06-23
Payer: COMMERCIAL

## 2025-06-23 DIAGNOSIS — E11.3313: ICD-10-CM

## 2025-06-23 DIAGNOSIS — E11.3311: ICD-10-CM

## 2025-06-23 DIAGNOSIS — H43.23: ICD-10-CM

## 2025-06-23 DIAGNOSIS — H35.033: ICD-10-CM

## 2025-06-23 DIAGNOSIS — H35.3131: ICD-10-CM

## 2025-06-23 PROCEDURE — 92134 CPTRZ OPH DX IMG PST SGM RTA: CPT | Performed by: SPECIALIST

## 2025-06-23 PROCEDURE — 67210 TREATMENT OF RETINAL LESION: CPT | Mod: 79,RT | Performed by: SPECIALIST

## 2025-06-23 ASSESSMENT — REFRACTION_AUTOREFRACTION
OD_SPHERE: +0.50
OS_SPHERE: +0.75
OD_AXIS: 115
OS_AXIS: 050
OS_CYLINDER: -0.75
OD_CYLINDER: -0.50

## 2025-06-23 ASSESSMENT — KERATOMETRY
OS_K2POWER_DIOPTERS: 46.50
OS_K1POWER_DIOPTERS: 45.50
OD_AXISANGLE_DEGREES: 055
METHOD_AUTO_MANUAL: AUTO
OD_K1POWER_DIOPTERS: 45.25
OS_AXISANGLE_DEGREES: 115
OD_K2POWER_DIOPTERS: 47.00

## 2025-06-23 ASSESSMENT — SUPERFICIAL PUNCTATE KERATITIS (SPK)
OD_SPK: T
OS_SPK: T

## 2025-06-23 ASSESSMENT — VISUAL ACUITY
OS_BCVA: 20/30
OD_BCVA: 20/25

## 2025-06-23 ASSESSMENT — TONOMETRY
OD_IOP_MMHG: 21
OS_IOP_MMHG: 14

## 2025-06-23 ASSESSMENT — CONFRONTATIONAL VISUAL FIELD TEST (CVF)
OD_FINDINGS: FULL
OS_FINDINGS: FULL

## 2025-08-07 ENCOUNTER — OFFICE (OUTPATIENT)
Dept: URBAN - METROPOLITAN AREA CLINIC 94 | Facility: CLINIC | Age: 65
Setting detail: OPHTHALMOLOGY
End: 2025-08-07
Payer: COMMERCIAL

## 2025-08-07 DIAGNOSIS — H35.033: ICD-10-CM

## 2025-08-07 DIAGNOSIS — E11.3311: ICD-10-CM

## 2025-08-07 PROCEDURE — 92134 CPTRZ OPH DX IMG PST SGM RTA: CPT | Performed by: SPECIALIST

## 2025-08-07 PROCEDURE — 67028 INJECTION EYE DRUG: CPT | Mod: 58,RT | Performed by: SPECIALIST

## 2025-08-07 ASSESSMENT — VISUAL ACUITY
OD_BCVA: 20/30-1
OS_BCVA: 20/30+1

## 2025-08-07 ASSESSMENT — REFRACTION_AUTOREFRACTION
OS_AXIS: 050
OD_CYLINDER: -0.50
OS_SPHERE: +0.75
OD_SPHERE: +0.50
OD_AXIS: 115
OS_CYLINDER: -0.75

## 2025-08-07 ASSESSMENT — KERATOMETRY
OS_K2POWER_DIOPTERS: 46.50
METHOD_AUTO_MANUAL: AUTO
OS_K1POWER_DIOPTERS: 45.50
OD_AXISANGLE_DEGREES: 055
OD_K2POWER_DIOPTERS: 47.00
OS_AXISANGLE_DEGREES: 115
OD_K1POWER_DIOPTERS: 45.25

## 2025-08-07 ASSESSMENT — SUPERFICIAL PUNCTATE KERATITIS (SPK)
OD_SPK: T
OS_SPK: T

## 2025-08-07 ASSESSMENT — TONOMETRY: OS_IOP_MMHG: 21

## 2025-08-07 ASSESSMENT — CONFRONTATIONAL VISUAL FIELD TEST (CVF)
OS_FINDINGS: FULL
OD_FINDINGS: FULL